# Patient Record
Sex: FEMALE | Race: WHITE
[De-identification: names, ages, dates, MRNs, and addresses within clinical notes are randomized per-mention and may not be internally consistent; named-entity substitution may affect disease eponyms.]

---

## 2018-05-31 ENCOUNTER — HOSPITAL ENCOUNTER (EMERGENCY)
Dept: HOSPITAL 56 - MW.ED | Age: 48
LOS: 1 days | Discharge: HOME | End: 2018-06-01
Payer: MEDICAID

## 2018-05-31 DIAGNOSIS — I10: ICD-10-CM

## 2018-05-31 DIAGNOSIS — K21.9: ICD-10-CM

## 2018-05-31 DIAGNOSIS — Z91.040: ICD-10-CM

## 2018-05-31 DIAGNOSIS — M62.830: ICD-10-CM

## 2018-05-31 DIAGNOSIS — Z79.4: ICD-10-CM

## 2018-05-31 DIAGNOSIS — E11.9: ICD-10-CM

## 2018-05-31 DIAGNOSIS — S39.012A: Primary | ICD-10-CM

## 2018-05-31 DIAGNOSIS — Z79.899: ICD-10-CM
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
## 2018-05-31 DIAGNOSIS — Z87.442: ICD-10-CM

## 2018-05-31 DIAGNOSIS — E78.5: ICD-10-CM

## 2018-05-31 LAB
CHLORIDE SERPL-SCNC: 99 MMOL/L (ref 98–107)
SODIUM SERPL-SCNC: 134 MMOL/L (ref 136–145)

## 2018-05-31 PROCEDURE — 96361 HYDRATE IV INFUSION ADD-ON: CPT

## 2018-05-31 PROCEDURE — 85025 COMPLETE CBC W/AUTO DIFF WBC: CPT

## 2018-05-31 PROCEDURE — 82150 ASSAY OF AMYLASE: CPT

## 2018-05-31 PROCEDURE — 96375 TX/PRO/DX INJ NEW DRUG ADDON: CPT

## 2018-05-31 PROCEDURE — 81001 URINALYSIS AUTO W/SCOPE: CPT

## 2018-05-31 PROCEDURE — 87086 URINE CULTURE/COLONY COUNT: CPT

## 2018-05-31 PROCEDURE — 76856 US EXAM PELVIC COMPLETE: CPT

## 2018-05-31 PROCEDURE — 83690 ASSAY OF LIPASE: CPT

## 2018-05-31 PROCEDURE — 96374 THER/PROPH/DIAG INJ IV PUSH: CPT

## 2018-05-31 PROCEDURE — 81025 URINE PREGNANCY TEST: CPT

## 2018-05-31 PROCEDURE — 99284 EMERGENCY DEPT VISIT MOD MDM: CPT

## 2018-05-31 PROCEDURE — 36415 COLL VENOUS BLD VENIPUNCTURE: CPT

## 2018-05-31 PROCEDURE — 74176 CT ABD & PELVIS W/O CONTRAST: CPT

## 2018-05-31 PROCEDURE — 80053 COMPREHEN METABOLIC PANEL: CPT

## 2018-05-31 NOTE — EDM.PDOC
ED HPI GENERAL MEDICAL PROBLEM





- General


Chief Complaint: Back Pain or Injury


Stated Complaint: LOWER BACK PAIN


Time Seen by Provider: 05/31/18 22:18


Source of Information: Reports: Patient


History Limitations: Reports: No Limitations





- History of Present Illness


INITIAL COMMENTS - FREE TEXT/NARRATIVE: 





HISTORY AND PHYSICAL:





History of present illness:


48-year-old female presenting with chief complaint of right flank pain 3 days 

with past medical history of type 2 diabetes insulin-dependent, hypertension, 

hyperlipidemia, and GERD.





Patient states that 3 days ago she began having right flank and lower back pain 

with radiation anteriorly to her right lower quadrant. She has had associated 

fever, chills, and nausea today. Pain is constant and 9 out of 10 currently.  

She does have a history of kidney stones 26 years ago. She denies any cough, 

sore throat, abdominal pain, dysuria, hematuria, diarrhea, bloody stool, or 

dark tarry stools. Patient also states that she has a history of "problems with 

my ovaries". States that she's had ovarian cyst rupture which caused 

significant pain. She does state that she's had an appendectomy as well as 

cholecystectomy however was told that there are remnants of the appendix left 

in place.  Patient currently denies any chest pain, palpitations, shortness of 

breath, syncopal episodes, focal neurologic deficits. 





Patient is allergic to latex.





-2300 CBC revealed leukocytosis of 14,000. CMP unremarkable. Amylase/lipase 

unremarkable. Urine pendin





-2330 urine unremarkable for infection or hematuria. CT abd/pelvis pending





-0100 CT abd/pelvis and Pelvic US unremarkable.








Review of systems: 


As per history of present illness and below otherwise all systems reviewed and 

negative.





Past medical history: 


As per history of present illness and as reviewed below otherwise 

noncontributory.





Surgical history: 


As per history of present illness and as reviewed below otherwise 

noncontributory.





Social history: 


No reported history of drug or alcohol abuse.





Family history: 


As per history of present illness and as reviewed below otherwise 

noncontributory.





Physical exam:


HEENT: Atraumatic, normocephalic, pupils reactive, negative for conjunctival 

pallor or scleral icterus, mucous membranes moist, throat clear, neck supple, 

nontender, trachea midline.


Lungs: Clear to auscultation, breath sounds equal bilaterally, chest nontender.


Heart: S1S2, regular, negative for clicks, rubs, or JVD.


Abdomen: Soft, nondistended, Tenderness to deep palpation RLQ. Negative for 

masses or hepatosplenomegaly. Right CVA tenderness.


Pelvis: Stable nontender.


Genitourinary: Deferred.


Rectal: Deferred.


Extremities: Atraumatic, negative for cords or calf pain. Neurovascular 

unremarkable.


Neuro: Awake, alert, oriented. Cranial nerves II through XII unremarkable. 

Cerebellum unremarkable. Motor and sensory unremarkable throughout. Exam 

nonfocal.





Diagnostics:


CBC, CMP, UA/UC, amylase, lipase, CT abd/pelvis, Pelvic US





Therapeutics:


1 L NS, 1 mg Dilaudid IV 2, 4 mg Zofran IV 2, Toradol 30 mg IV 1, Flexeril 

10 mg by mouth 3 times a day #12





Impression: 


Acute back strain


Paraspinal muscle spasms





Plan:


CBC showed mild leukocytosis but patient remained afebrile throughout her stay. 

Leukocytosis is most likely stress response from her acute pain. UA, amylase, 

lipase, CT abdomen pelvis as well as a pelvic ultrasound were negative for any 

acute pathologic findings to explain patient's right-sided pain. Pain most 

likely associated with back muscle spasms. Patient did state that she drove 

less than a week ago over 12 hours from [place] which is where she is from. 

She does have a history of back surgery. Patient's pain and nausea was 

controlled and she was discharged with instructions to follow-up with her 

primary care physician and return to emergency department if she had any new or 

worsening symptoms. She was given a prescription for Flexeril 10 mg by mouth 3 

times a day #12 to be taken for her paraspinal muscle spasms.





  ** Right Back


Pain Score (Numeric/FACES): 8





- Related Data


 Allergies











Allergy/AdvReac Type Severity Reaction Status Date / Time


 


latex Allergy  Hives Verified 05/31/18 22:29











Home Meds: 


 Home Meds





Insulin Glarg,Human.Rec.Analog [Lantus] 20 unit DAILY 05/31/18 [History]


Losartan [Cozaar] 1 tab DAILY 05/31/18 [History]


Omeprazole 40 mg PO DAILY 05/31/18 [History]


atorvaSTATin [Lipitor] 1 tab DAILY 05/31/18 [History]


metFORMIN [Glucophage XR] 2 tab DAILY 05/31/18 [History]











ED ROS GENERAL





- Review of Systems


Review Of Systems: ROS reveals no pertinent complaints other than HPI.





ED EXAM, GENERAL





- Physical Exam


Exam: See Below





Course





- Vital Signs


Last Recorded V/S: 


 Last Vital Signs











Temp  99.8 F   05/31/18 22:25


 


Pulse  112 H  05/31/18 22:25


 


Resp  20   05/31/18 22:25


 


BP  198/87 H  05/31/18 22:25


 


Pulse Ox  100   05/31/18 22:25














- Orders/Labs/Meds


Orders: 


 Active Orders 24 hr











 Category Date Time Status


 


 Abdomen Pelvis wo Cont [CT] Stat Exams  05/31/18 23:29 Taken


 


 Pelvis Non OB Comp [US] Stat Exams  06/01/18 00:17 Taken


 


 CULTURE URINE [RM] Stat Lab  05/31/18 23:18 Ordered


 


 HCG QUALITATIVE,URINE [URCHEM] Stat Lab  05/31/18 23:18 Ordered


 


 UA W/MICROSCOPIC [URIN] Stat Lab  05/31/18 23:18 Ordered











Labs: 


 Laboratory Tests











  05/31/18 05/31/18 05/31/18 Range/Units





  22:54 22:54 23:18 


 


WBC  14.16 H    (4.0-11.0)  K/uL


 


RBC  4.94    (4.30-5.90)  M/uL


 


Hgb  14.4    (12.0-16.0)  g/dL


 


Hct  41.9    (36.0-46.0)  %


 


MCV  84.8    (80.0-98.0)  fL


 


MCH  29.1    (27.0-32.0)  pg


 


MCHC  34.4    (31.0-37.0)  g/dL


 


RDW Std Deviation  41.8    (28.0-62.0)  fl


 


RDW Coeff of Jamey  14    (11.0-15.0)  %


 


Plt Count  465 H    (150-400)  K/uL


 


MPV  9.30    (7.40-12.00)  fL


 


Neut % (Auto)  74.5    (48.0-80.0)  %


 


Lymph % (Auto)  18.4    (16.0-40.0)  %


 


Mono % (Auto)  6.4    (0.0-15.0)  %


 


Eos % (Auto)  0.4    (0.0-7.0)  %


 


Baso % (Auto)  0.3    (0.0-1.5)  %


 


Neut # (Auto)  10.6 H    (1.4-5.7)  K/uL


 


Lymph # (Auto)  2.6 H    (0.6-2.4)  K/uL


 


Mono # (Auto)  0.9 H    (0.0-0.8)  K/uL


 


Eos # (Auto)  0.1    (0.0-0.7)  K/uL


 


Baso # (Auto)  0.0    (0.0-0.1)  K/uL


 


Nucleated RBC %  0.0    /100WBC


 


Nucleated RBCs #  0    K/uL


 


Sodium   134 L   (136-145)  mmol/L


 


Potassium   3.9   (3.5-5.1)  mmol/L


 


Chloride   99   ()  mmol/L


 


Carbon Dioxide   24.2   (21.0-32.0)  mmol/L


 


BUN   9   (7.0-18.0)  mg/dL


 


Creatinine   0.9   (0.6-1.0)  mg/dL


 


Est Cr Clr Drug Dosing   61.77   mL/min


 


Estimated GFR (MDRD)   > 60.0   ml/min


 


Glucose   271 H   ()  mg/dL


 


Calcium   9.8   (8.5-10.1)  mg/dL


 


Total Bilirubin   0.2   (0.2-1.0)  mg/dL


 


AST   15   (15-37)  IU/L


 


ALT   24   (14-63)  IU/L


 


Alkaline Phosphatase   58   ()  U/L


 


Total Protein   7.9   (6.4-8.2)  g/dL


 


Albumin   3.8   (3.4-5.0)  g/dL


 


Globulin   4.1 H   (2.0-3.5)  g/dL


 


Albumin/Globulin Ratio   0.9 L   (1.3-2.8)  


 


Amylase   73   ()  U/L


 


Lipase   185   ()  U/L


 


Urine Color    YELLOW  


 


Urine Appearance    CLEAR  


 


Urine pH    6.5  (5.0-8.0)  


 


Ur Specific Gravity    1.015  (1.001-1.035)  


 


Urine Protein    NEGATIVE  (NEGATIVE)  mg/dL


 


Urine Glucose (UA)    500 H  (NEGATIVE)  mg/dL


 


Urine Ketones    NEGATIVE  (NEGATIVE)  mg/dL


 


Urine Occult Blood    NEGATIVE  (NEGATIVE)  


 


Urine Nitrite    NEGATIVE  (NEGATIVE)  


 


Urine Bilirubin    NEGATIVE  (NEGATIVE)  


 


Urine Urobilinogen    0.2  (<2.0)  EU/dL


 


Ur Leukocyte Esterase    NEGATIVE  (NEGATIVE)  


 


Urine RBC    0-2  (0-2/HPF)  


 


Urine WBC    0-1  (0-5/HPF)  


 


Ur Epithelial Cells    OCCASIONAL  (NONE-FEW)  


 


Urine Bacteria    RARE  (NEGATIVE)  


 


Urine Mucus    LIGHT  (NONE-MOD)  


 


Urine HCG, Qual     (NEGATIVE)  














  05/31/18 Range/Units





  23:18 


 


WBC   (4.0-11.0)  K/uL


 


RBC   (4.30-5.90)  M/uL


 


Hgb   (12.0-16.0)  g/dL


 


Hct   (36.0-46.0)  %


 


MCV   (80.0-98.0)  fL


 


MCH   (27.0-32.0)  pg


 


MCHC   (31.0-37.0)  g/dL


 


RDW Std Deviation   (28.0-62.0)  fl


 


RDW Coeff of Jamey   (11.0-15.0)  %


 


Plt Count   (150-400)  K/uL


 


MPV   (7.40-12.00)  fL


 


Neut % (Auto)   (48.0-80.0)  %


 


Lymph % (Auto)   (16.0-40.0)  %


 


Mono % (Auto)   (0.0-15.0)  %


 


Eos % (Auto)   (0.0-7.0)  %


 


Baso % (Auto)   (0.0-1.5)  %


 


Neut # (Auto)   (1.4-5.7)  K/uL


 


Lymph # (Auto)   (0.6-2.4)  K/uL


 


Mono # (Auto)   (0.0-0.8)  K/uL


 


Eos # (Auto)   (0.0-0.7)  K/uL


 


Baso # (Auto)   (0.0-0.1)  K/uL


 


Nucleated RBC %   /100WBC


 


Nucleated RBCs #   K/uL


 


Sodium   (136-145)  mmol/L


 


Potassium   (3.5-5.1)  mmol/L


 


Chloride   ()  mmol/L


 


Carbon Dioxide   (21.0-32.0)  mmol/L


 


BUN   (7.0-18.0)  mg/dL


 


Creatinine   (0.6-1.0)  mg/dL


 


Est Cr Clr Drug Dosing   mL/min


 


Estimated GFR (MDRD)   ml/min


 


Glucose   ()  mg/dL


 


Calcium   (8.5-10.1)  mg/dL


 


Total Bilirubin   (0.2-1.0)  mg/dL


 


AST   (15-37)  IU/L


 


ALT   (14-63)  IU/L


 


Alkaline Phosphatase   ()  U/L


 


Total Protein   (6.4-8.2)  g/dL


 


Albumin   (3.4-5.0)  g/dL


 


Globulin   (2.0-3.5)  g/dL


 


Albumin/Globulin Ratio   (1.3-2.8)  


 


Amylase   ()  U/L


 


Lipase   ()  U/L


 


Urine Color   


 


Urine Appearance   


 


Urine pH   (5.0-8.0)  


 


Ur Specific Gravity   (1.001-1.035)  


 


Urine Protein   (NEGATIVE)  mg/dL


 


Urine Glucose (UA)   (NEGATIVE)  mg/dL


 


Urine Ketones   (NEGATIVE)  mg/dL


 


Urine Occult Blood   (NEGATIVE)  


 


Urine Nitrite   (NEGATIVE)  


 


Urine Bilirubin   (NEGATIVE)  


 


Urine Urobilinogen   (<2.0)  EU/dL


 


Ur Leukocyte Esterase   (NEGATIVE)  


 


Urine RBC   (0-2/HPF)  


 


Urine WBC   (0-5/HPF)  


 


Ur Epithelial Cells   (NONE-FEW)  


 


Urine Bacteria   (NEGATIVE)  


 


Urine Mucus   (NONE-MOD)  


 


Urine HCG, Qual  NEGATIVE  (NEGATIVE)  











Meds: 


Medications














Discontinued Medications














Generic Name Dose Route Start Last Admin





  Trade Name Bucky  PRN Reason Stop Dose Admin


 


Hydromorphone HCl  Confirm  05/31/18 22:37  05/31/18 23:09





  Dilaudid  Administered  05/31/18 22:38  1 mg





  Dose   Administration





  1 mg   





  .ROUTE   





  .STK-MED ONE   





     





     





     





     


 


Hydromorphone HCl  1 mg  05/31/18 23:23  05/31/18 23:27





  Dilaudid  IVPUSH  05/31/18 23:24  Not Given





  ONETIME ONE   





     





     





     





     


 


Hydromorphone HCl  1 mg  05/31/18 23:28  05/31/18 23:33





  Dilaudid  IVPUSH  05/31/18 23:29  1 mg





  ONETIME ONE   Administration





     





     





     





     


 


Sodium Chloride  1,000 mls @ 999 mls/hr  05/31/18 22:42  05/31/18 23:09





  Normal Saline  IV  05/31/18 23:42  999 mls/hr





  STAT ONE   Administration





     





     





     





     


 


Ketorolac Tromethamine  30 mg  06/01/18 01:18  06/01/18 01:23





  Toradol  IVPUSH  06/01/18 01:19  30 mg





  ONETIME ONE   Administration





     





     





     





     


 


Ondansetron HCl  Confirm  05/31/18 22:38  05/31/18 23:09





  Zofran  Administered  05/31/18 22:39  4 mg





  Dose   Administration





  4 mg   





  .ROUTE   





  .STK-MED ONE   





     





     





     





     


 


Ondansetron HCl  4 mg  05/31/18 23:23  05/31/18 23:28





  Zofran  IVPUSH  05/31/18 23:24  Not Given





  ONETIME ONE   





     





     





     





     


 


Ondansetron HCl  4 mg  05/31/18 23:28  05/31/18 23:33





  Zofran  IVPUSH  05/31/18 23:29  4 mg





  ONETIME ONE   Administration





     





     





     





     














Departure





- Departure


Time of Disposition: 01:30


Disposition: Home, Self-Care 01


Condition: Good


Clinical Impression: 


Acute lumbar myofascial strain


Qualifiers:


 Encounter type: initial encounter Qualified Code(s): S39.012A - Strain of 

muscle, fascia and tendon of lower back, initial encounter








- Discharge Information


Referrals: 


PCP,None [Primary Care Provider] - 


Forms:  ED Department Discharge


Additional Instructions: 


My general discharge


The following information is given to patients seen in the emergency department 

who are being discharged to home. This information is to outline your options 

for follow-up care. We provide all patients seen in our emergency department 

with a follow-up referral.





The need for follow-up, as well as the timing and circumstances, are variable 

depending upon the specifics of your emergency department visit.





If you don't have a primary care physician on staff, we will provide you with a 

referral. We always advise you to contact your personal physician following an 

emergency department visit to inform them of the circumstance of the visit and 

for follow-up with them and/or the need for any referrals to a consulting 

specialist.





The emergency department will also refer you to a specialist when appropriate. 

This referral assures that you have the opportunity for follow-up care with a 

specialist. All of these measure are taken in an effort to provide you with 

optimal care, which includes your follow-up.





Under all circumstances we always encourage you to contact your private 

physician who remains a resource for coordinating your care. When calling for 

follow-up care, please make the office aware that this follow-up is from your 

recent emergency room visit. If for any reason you are refused follow-up, 

please contact the [facility] Emergency 

Department at [phone] and asked to speak to the emergency department 

charge nurse.





[facility]


Primary Care


[address] [place] [address]


Phone: [phone]


Fax: [phone]





[facility]


[address] [place] [address]


Phone: [phone]


Fax: [phone]








- My Orders


Last 24 Hours: 


My Active Orders





05/31/18 23:18


CULTURE URINE [RM] Stat 


HCG QUALITATIVE,URINE [URCHEM] Stat 


UA W/MICROSCOPIC [URIN] Stat 





05/31/18 23:29


Abdomen Pelvis wo Cont [CT] Stat 





06/01/18 00:17


Pelvis Non OB Comp [US] Stat 














- Assessment/Plan


Last 24 Hours: 


My Active Orders





05/31/18 23:18


CULTURE URINE [RM] Stat 


HCG QUALITATIVE,URINE [URCHEM] Stat 


UA W/MICROSCOPIC [URIN] Stat 





05/31/18 23:29


Abdomen Pelvis wo Cont [CT] Stat 





06/01/18 00:17


Pelvis Non OB Comp [US] Stat

## 2018-06-01 NOTE — US
EXAM DATE: 18



PATIENT'S AGE: 48



Patient: RAEGAN MICHELLE



Facility: [place], [place]

Patient ID: [id number]

Site Patient ID: [id number].

Site Accession #: [id number]

: 1970

Study: US Pelvis [id number]/1/2018 1:04:40 AM

Ordering Physician: Monroe Wilkerson



Final Report: 

INDICATION: Right pelvic pain



TECHNIQUE: Ultrasound pelvis transabdominal and transvaginal. Endovaginal 
imaging was performed to better visualize the endometrium and ovaries. Real-
time gray scale sonographic images with spectral and color Doppler imaging of 
the ovaries were obtained.



COMPARISON: CT today



FINDINGS: 



Uterus: 10 x 5.3 x 6.7 cm. Normal echotexture of the myometrium noted with no 
masses are seen. 



Endometrium: 5 mm. No sign of endometrial mass or fluid present. 



Right ovary: 2 x 1.9 x 2.1 cm. Normal arterial and venous blood flow seen in 
the right ovary. 



Left ovary: 2.4 x 1.5 x 2.1 cm. A small complex cyst is present in the left 
ovary measuring 1.4 cm. Arterial blood flow is seen within the left ovary. 



Cul-de-sac: No significant ascites noted. 



IMPRESSION: 

1. Unremarkable pelvic ultrasound.



Dictated by To Jameson MD @ 2018 1:07:23 AM







Dictated by: To Jameson MD @ 2018 01:07:46

(Electronic Signature)





Report Signed by Proxy.
TRAY

## 2018-06-01 NOTE — CT
EXAM DATE: 18



PATIENT'S AGE: 48



Patient: RAEGAN MICHELLE



Facility: [place], [place]

Patient ID: [id number]

Site Patient ID: [id number].

Site Accession #: [id number]

: 1970

Study: CT Abdomen/Pelvis [id number]/1/2018 12:03:15 AM

Ordering Physician: Monroe Wilkerson



Final Report: 

INDICATION:

Right flank pain, fever, leukocytosis 



TECHNIQUE:

CT abdomen and pelvis without contrast.



COMPARISON:

None 



FINDINGS:

Lower chest: Unremarkable. 



Liver: Unremarkable. 



Spleen: Unremarkable. 



Pancreas: Unremarkable. 



Gallbladder and bile ducts: S/p cholecystectomy. 



Adrenal glands: Unremarkable. 



Kidneys: There is scarring on both kidneys. There is a 2 millimeter peripheral 
parenchymal calcification in the left kidney. There is a 2 mm nonobstructive 
left renal stone. No hydronephrosis or hydroureter. No perinephric fat 
stranding. 



GI tract: Unremarkable. Appendix is not visualized. No inflammatory changes in 
the right lower quadrant. 



Vascular structures: Unremarkable. 



Lymph nodes: Unremarkable. 



Pelvic Organs: Unremarkable. 



Bones: Unremarkable for age. 



IMPRESSION:

No CT evidence of pyelonephritis or acute intra-abdominal process. 



Nonobstructive left nephrolithiasis. 



Bilateral renal scarring.



Status post cholecystectomy.



Please note that all CT scans at this facility use dose modulation, iterative 
reconstruction, and/or weight-based dosing when appropriate to reduce radiation 
dose to as low as reasonably achievable.



Dictated by Alise Gil MD @ 2018 12:05AM

(Electronic Signature)





Report Signed by Proxy.
TRAY

## 2018-06-03 ENCOUNTER — HOSPITAL ENCOUNTER (EMERGENCY)
Dept: HOSPITAL 56 - MW.ED | Age: 48
Discharge: LEFT BEFORE DISCHARGE | End: 2018-06-03
Payer: MEDICAID

## 2018-06-03 DIAGNOSIS — Z79.4: ICD-10-CM
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
## 2018-06-03 DIAGNOSIS — Z53.20: ICD-10-CM

## 2018-06-03 DIAGNOSIS — S39.012A: Primary | ICD-10-CM

## 2018-06-03 DIAGNOSIS — F17.210: ICD-10-CM

## 2018-06-03 DIAGNOSIS — E11.9: ICD-10-CM

## 2018-06-03 PROCEDURE — 99283 EMERGENCY DEPT VISIT LOW MDM: CPT

## 2018-06-03 PROCEDURE — 96372 THER/PROPH/DIAG INJ SC/IM: CPT

## 2018-06-03 NOTE — EDM.PDOC
ED HPI GENERAL MEDICAL PROBLEM





- General


Chief Complaint: Back Pain or Injury


Stated Complaint: BACK PAIN- LOWER RIGHT SIDE


Time Seen by Provider: 06/03/18 02:50


Source of Information: Reports: Patient


History Limitations: Reports: No Limitations





- History of Present Illness


INITIAL COMMENTS - FREE TEXT/NARRATIVE: 





HISTORY AND PHYSICAL:





History of present illness:


48-year-old female presents department with chief complaint of lower back pain 

who was previously seen here on 6/1/18.





Patient states that she returns today with similar mid/lower back pain similar 

to the pain that she had when I saw her in 6/1. At that time we did do a CBC, 

CMP, UA, amylase, lipase, CT abdomen and pelvis, as well as a pelvic ultrasound 

which were all unremarkable. She was also given at that time 1 L normal saline 

and a total of 2 mg of Dilaudid as well as 4 mg of Zofran. She was discharged 

with a prescription for Flexeril secondary to back muscle spasms. States that 

her pain is back and the Flexeril is not working. We did give her 60 mg of 

Toradol IM 1.





As per nursing when they went to check on patient she left AMA.





Review of systems: 


As per history of present illness and below otherwise all systems reviewed and 

negative.





Past medical history: 


As per history of present illness and as reviewed below otherwise 

noncontributory.





Surgical history: 


As per history of present illness and as reviewed below otherwise 

noncontributory.





Social history: 


No reported history of drug or alcohol abuse.





Family history: 


As per history of present illness and as reviewed below otherwise 

noncontributory.





Physical exam:


HEENT: Atraumatic, normocephalic, pupils reactive, negative for conjunctival 

pallor or scleral icterus, mucous membranes moist, throat clear, neck supple, 

nontender, trachea midline.


Lungs: Clear to auscultation, breath sounds equal bilaterally, chest nontender.


Heart: S1S2, regular, negative for clicks, rubs, or JVD.


Abdomen: Soft, nondistended, nontender. Negative for masses or 

hepatosplenomegaly. Negative for costovertebral tenderness.


Pelvis: Stable nontender.


Genitourinary: Deferred.


Rectal: Deferred.


Extremities: Atraumatic, negative for cords or calf pain. Neurovascular 

unremarkable.


Neuro: Awake, alert, oriented. Cranial nerves II through XII unremarkable. 

Cerebellum unremarkable. Motor and sensory unremarkable throughout. Exam 

nonfocal.


Back: Right lower mid paraspinal muscle spasms and pain on palpation, no pain 

on vertebral palpation.





Diagnostics:


[]





Therapeutics:


Toradol IM 60 mg





Impression: 


Back strain left AMA





Plan:


Patient left AMA after being given 60 mg IM Toradol. We did do a complete 

workup 2 days prior including CT abdomen and pelvis, pelvic ultrasound, CBC, CMP

, amylase, lipase, urinalysis.





Definitive disposition and diagnosis as appropriate pending reevaluation and 

review of above.








- Related Data


 Allergies











Allergy/AdvReac Type Severity Reaction Status Date / Time


 


latex Allergy  Hives Verified 06/03/18 05:47











Home Meds: 


 Home Meds





Insulin Glarg,Human.Rec.Analog [Lantus] 20 unit SUBCUT DAILY 05/31/18 [History]


Losartan [Cozaar] 1 tab DAILY 05/31/18 [History]


Omeprazole 40 mg PO DAILY 05/31/18 [History]


atorvaSTATin [Lipitor] 1 tab DAILY 05/31/18 [History]


metFORMIN [Glucophage XR] 2 tab DAILY 05/31/18 [History]











Past Medical History


Genitourinary History: Reports: Renal Calculus


OB/GYN History: Reports: Polycystic Ovaries


Musculoskeletal History: Reports: Back Pain, Chronic


Endocrine/Metabolic History: Reports: Diabetes, Type II





- Past Surgical History


Cardiovascular Surgical History: Reports: Vascular Surgery


GI Surgical History: Reports: Cholecystectomy


Musculoskeletal Surgical History: Reports: Other (See Below)


Other Musculoskeletal Surgeries/Procedures:: back surgery





Social & Family History





- Family History


Family Medical History: Noncontributory





- Tobacco Use


Smoking Status *Q: Current Every Day Smoker


Years of Tobacco use: 20


Packs/Tins Daily: 0.5


Used Tobacco, but Quit: No


Second Hand Smoke Exposure: No





- Caffeine Use


Caffeine Use: Reports: Coffee, Soda





- Recreational Drug Use


Recreational Drug Use: No





ED ROS GENERAL





- Review of Systems


Review Of Systems: ROS reveals no pertinent complaints other than HPI.





ED EXAM, GENERAL





- Physical Exam


Exam: See Below





Course





- Vital Signs


Last Recorded V/S: 


 Last Vital Signs











Temp  97.3 F   06/03/18 03:01


 


Pulse  86   06/03/18 03:01


 


Resp  15   06/03/18 03:01


 


BP  150/95 H  06/03/18 03:01


 


Pulse Ox  98   06/03/18 03:01














- Orders/Labs/Meds


Meds: 


Medications














Discontinued Medications














Generic Name Dose Route Start Last Admin





  Trade Name Bucky  PRN Reason Stop Dose Admin


 


Ketorolac Tromethamine  60 mg  06/03/18 03:01  06/03/18 03:06





  Toradol  IM  06/03/18 03:02  60 mg





  ONETIME ONE   Administration





     





     





     





     














Departure





- Departure


Time of Disposition: 06:09


Disposition: Eloped 07


Condition: Good


Clinical Impression: 


Back strain


Qualifiers:


 Encounter type: subsequent encounter Qualified Code(s): S39.012D - Strain of 

muscle, fascia and tendon of lower back, subsequent encounter








- Discharge Information


Referrals: 


PCP,None [Primary Care Provider] - 


Forms:  ED Department Discharge